# Patient Record
Sex: MALE | Race: WHITE
[De-identification: names, ages, dates, MRNs, and addresses within clinical notes are randomized per-mention and may not be internally consistent; named-entity substitution may affect disease eponyms.]

---

## 2020-09-17 ENCOUNTER — ERX REFILL RESPONSE (OUTPATIENT)
Age: 35
End: 2020-09-17

## 2020-09-17 RX ORDER — MESALAMINE 1.2 G/1
TAKE 4 TABLETS BY MOUTH DAILY WITH MEAL TABLET, DELAYED RELEASE ORAL ONCE A DAY
Qty: 120 TABLET | Refills: 0

## 2020-10-19 ENCOUNTER — ERX REFILL RESPONSE (OUTPATIENT)
Age: 35
End: 2020-10-19

## 2020-10-19 RX ORDER — MESALAMINE 1.2 G/1
TAKE 4 TABLETS BY MOUTH DAILY WITH MEAL TABLET, DELAYED RELEASE ORAL ONCE A DAY
Qty: 120 | Refills: 0

## 2020-11-20 ENCOUNTER — TELEPHONE ENCOUNTER (OUTPATIENT)
Dept: URBAN - METROPOLITAN AREA CLINIC 92 | Facility: CLINIC | Age: 35
End: 2020-11-20

## 2020-11-20 ENCOUNTER — ERX REFILL RESPONSE (OUTPATIENT)
Age: 35
End: 2020-11-20

## 2020-11-20 RX ORDER — MESALAMINE 1.2 G/1
TAKE 4 TABLETS BY MOUTH DAILY WITH MEAL TABLET, DELAYED RELEASE ORAL ONCE A DAY
Qty: 120 | Refills: 0

## 2020-11-30 ENCOUNTER — OFFICE VISIT (OUTPATIENT)
Dept: URBAN - METROPOLITAN AREA CLINIC 126 | Facility: CLINIC | Age: 35
End: 2020-11-30
Payer: COMMERCIAL

## 2020-11-30 ENCOUNTER — WEB ENCOUNTER (OUTPATIENT)
Dept: URBAN - METROPOLITAN AREA CLINIC 126 | Facility: CLINIC | Age: 35
End: 2020-11-30

## 2020-11-30 ENCOUNTER — LAB OUTSIDE AN ENCOUNTER (OUTPATIENT)
Dept: URBAN - METROPOLITAN AREA CLINIC 126 | Facility: CLINIC | Age: 35
End: 2020-11-30

## 2020-11-30 DIAGNOSIS — K51.00 ULCERATIVE PANCOLITIS WITHOUT COMPLICATION: ICD-10-CM

## 2020-11-30 DIAGNOSIS — R53.83 DECREASED ENERGY: ICD-10-CM

## 2020-11-30 PROCEDURE — G8427 DOCREV CUR MEDS BY ELIG CLIN: HCPCS | Performed by: INTERNAL MEDICINE

## 2020-11-30 PROCEDURE — G8420 CALC BMI NORM PARAMETERS: HCPCS | Performed by: INTERNAL MEDICINE

## 2020-11-30 PROCEDURE — G8482 FLU IMMUNIZE ORDER/ADMIN: HCPCS | Performed by: INTERNAL MEDICINE

## 2020-11-30 PROCEDURE — 99214 OFFICE O/P EST MOD 30 MIN: CPT | Performed by: INTERNAL MEDICINE

## 2020-11-30 RX ORDER — MESALAMINE 1.2 G/1
TAKE 4 TABLETS BY MOUTH DAILY WITH MEAL TABLET, DELAYED RELEASE ORAL ONCE A DAY
Qty: 120 | Refills: 0 | Status: ACTIVE | COMMUNITY

## 2020-11-30 RX ORDER — DICYCLOMINE HYDROCHLORIDE 10 MG/1
TAKE 1 CAPSULE (10 MG) BY ORAL ROUTE 3 TIMES PER DAY AS NEEDED FOR ABDOMINAL PAIN CAPSULE ORAL
Qty: 20 | Refills: 0 | Status: ACTIVE | COMMUNITY
Start: 2019-03-13

## 2020-11-30 NOTE — HPI-TODAY'S VISIT:
Very pleasant 35 yr old male with Ulcerative colitis diagnosed 2018, He is on mesalamine. He denies UC symptoms. Has 1-2 BM/day. no blood. no abdominal pain. he has noted decreased energy lately.  No EIM.

## 2020-12-01 LAB
A/G RATIO: 1.5
ALBUMIN: 4.1
ALKALINE PHOSPHATASE: 116
ALT (SGPT): 17
AST (SGOT): 16
BASO (ABSOLUTE): 0
BASOS: 1
BILIRUBIN, TOTAL: 0.3
BUN/CREATININE RATIO: 10
BUN: 17
C-REACTIVE PROTEIN, QUANT: 18
CALCIUM: 9.1
CARBON DIOXIDE, TOTAL: 21
CHLORIDE: 101
CREATININE: 1.67
EGFR IF AFRICN AM: 60
EGFR IF NONAFRICN AM: 52
EOS (ABSOLUTE): 0.1
EOS: 2
GLOBULIN, TOTAL: 2.7
GLUCOSE: 80
HEMATOCRIT: 38.7
HEMATOLOGY COMMENTS:: (no result)
HEMOGLOBIN: 12.9
IMMATURE CELLS: (no result)
IMMATURE GRANS (ABS): 0
IMMATURE GRANULOCYTES: 0
LYMPHS (ABSOLUTE): 1
LYMPHS: 16
MCH: 27.6
MCHC: 33.3
MCV: 83
MONOCYTES(ABSOLUTE): 0.6
MONOCYTES: 9
NEUTROPHILS (ABSOLUTE): 4.8
NEUTROPHILS: 72
NRBC: (no result)
PLATELETS: 346
POTASSIUM: 4.6
PROTEIN, TOTAL: 6.8
RBC: 4.68
RDW: 11.9
SEDIMENTATION RATE-WESTERGREN: 34
SODIUM: 139
TSH: 1.77
WBC: 6.6

## 2021-01-03 ENCOUNTER — ERX REFILL RESPONSE (OUTPATIENT)
Age: 36
End: 2021-01-03

## 2021-01-03 RX ORDER — MESALAMINE 1.2 G/1
TAKE 4 TABLETS BY MOUTH DAILY WITH MEAL TABLET, DELAYED RELEASE ORAL
Qty: 120 | Refills: 8

## 2021-02-04 ENCOUNTER — TELEPHONE ENCOUNTER (OUTPATIENT)
Dept: URBAN - METROPOLITAN AREA CLINIC 80 | Facility: CLINIC | Age: 36
End: 2021-02-04

## 2021-02-08 ENCOUNTER — OFFICE VISIT (OUTPATIENT)
Dept: URBAN - METROPOLITAN AREA SURGERY CENTER 19 | Facility: SURGERY CENTER | Age: 36
End: 2021-02-08

## 2021-03-10 ENCOUNTER — OFFICE VISIT (OUTPATIENT)
Dept: URBAN - METROPOLITAN AREA CLINIC 126 | Facility: CLINIC | Age: 36
End: 2021-03-10

## 2021-04-12 ENCOUNTER — OFFICE VISIT (OUTPATIENT)
Dept: URBAN - METROPOLITAN AREA SURGERY CENTER 19 | Facility: SURGERY CENTER | Age: 36
End: 2021-04-12
Payer: COMMERCIAL

## 2021-04-12 DIAGNOSIS — K51.00 CHRONIC PANCOLONIC ULCERATIVE COLITIS: ICD-10-CM

## 2021-04-12 PROCEDURE — G8907 PT DOC NO EVENTS ON DISCHARG: HCPCS | Performed by: INTERNAL MEDICINE

## 2021-04-12 PROCEDURE — 45380 COLONOSCOPY AND BIOPSY: CPT | Performed by: INTERNAL MEDICINE

## 2021-04-12 RX ORDER — MESALAMINE 1.2 G/1
TAKE 4 TABLETS BY MOUTH DAILY WITH MEAL TABLET, DELAYED RELEASE ORAL
Qty: 120 | Refills: 8 | Status: ACTIVE | COMMUNITY

## 2021-04-12 RX ORDER — DICYCLOMINE HYDROCHLORIDE 10 MG/1
TAKE 1 CAPSULE (10 MG) BY ORAL ROUTE 3 TIMES PER DAY AS NEEDED FOR ABDOMINAL PAIN CAPSULE ORAL
Qty: 20 | Refills: 0 | Status: ACTIVE | COMMUNITY
Start: 2019-03-13

## 2021-04-21 ENCOUNTER — OFFICE VISIT (OUTPATIENT)
Dept: URBAN - METROPOLITAN AREA TELEHEALTH 2 | Facility: TELEHEALTH | Age: 36
End: 2021-04-21
Payer: COMMERCIAL

## 2021-04-21 DIAGNOSIS — R19.7 DIARRHEA, UNSPECIFIED TYPE: ICD-10-CM

## 2021-04-21 DIAGNOSIS — K51.00 ULCERATIVE PANCOLITIS WITHOUT COMPLICATION: ICD-10-CM

## 2021-04-21 PROCEDURE — 99443 PHONE E/M BY PHYS 21-30 MIN: CPT | Performed by: INTERNAL MEDICINE

## 2021-04-21 RX ORDER — BUDESONIDE 3 MG/1
3 CAPSULES DAILY FOR 30 DAYS, THEN 2 DAILY FOR 2 WEEKS, THEN 1 DAILY FOR 1 WEEK CAPSULE, COATED PELLETS ORAL ONCE A DAY
Qty: 90 CAPSULE | Refills: 1 | OUTPATIENT

## 2021-04-21 RX ORDER — MESALAMINE 1.2 G/1
TAKE 4 TABLETS BY MOUTH DAILY WITH MEAL TABLET, DELAYED RELEASE ORAL
Qty: 120 | Refills: 8 | Status: ACTIVE | COMMUNITY

## 2021-04-21 RX ORDER — DICYCLOMINE HYDROCHLORIDE 10 MG/1
TAKE 1 CAPSULE (10 MG) BY ORAL ROUTE 3 TIMES PER DAY AS NEEDED FOR ABDOMINAL PAIN CAPSULE ORAL
Qty: 20 | Refills: 0 | Status: ACTIVE | COMMUNITY
Start: 2019-03-13

## 2021-04-21 NOTE — HPI-TODAY'S VISIT:
35 yr old male with ulcerative colitis-takes mesalamine Flare past 6 months bad past 3 months blood with BM 8-10, has lost 20 pounds not much cramping colonoscopy 4/12/21 with erythema and bx with active chronic colitis Supposed to get his 2nd dose of Moderna tomorrow

## 2021-04-27 ENCOUNTER — OFFICE VISIT (OUTPATIENT)
Dept: URBAN - METROPOLITAN AREA TELEHEALTH 2 | Facility: TELEHEALTH | Age: 36
End: 2021-04-27

## 2021-04-29 ENCOUNTER — LAB OUTSIDE AN ENCOUNTER (OUTPATIENT)
Dept: URBAN - METROPOLITAN AREA CLINIC 80 | Facility: CLINIC | Age: 36
End: 2021-04-29

## 2021-04-30 LAB
A/G RATIO: 1.4
ALBUMIN: 4
ALKALINE PHOSPHATASE: 101
ALT (SGPT): 10
AST (SGOT): 13
BASO (ABSOLUTE): 0
BASOS: 1
BILIRUBIN, TOTAL: 0.2
BUN/CREATININE RATIO: 9
BUN: 17
C-REACTIVE PROTEIN, QUANT: 33
CALCIUM: 9.1
CARBON DIOXIDE, TOTAL: 23
CHLORIDE: 103
CREATININE: 1.95
EGFR IF AFRICN AM: 49
EGFR IF NONAFRICN AM: 43
EOS (ABSOLUTE): 0.2
EOS: 3
GLOBULIN, TOTAL: 2.8
GLUCOSE: 80
HBSAG SCREEN: NEGATIVE
HEMATOCRIT: 36.7
HEMATOLOGY COMMENTS:: (no result)
HEMOGLOBIN: 11.4
HEP A AB, IGM: NEGATIVE
HEP B CORE AB, IGM: NEGATIVE
HEP C VIRUS AB: <0.1
IMMATURE CELLS: (no result)
IMMATURE GRANS (ABS): 0
IMMATURE GRANULOCYTES: 1
LYMPHS (ABSOLUTE): 0.9
LYMPHS: 16
MCH: 25.7
MCHC: 31.1
MCV: 83
MONOCYTES(ABSOLUTE): 0.6
MONOCYTES: 11
NEUTROPHILS (ABSOLUTE): 3.8
NEUTROPHILS: 68
NRBC: (no result)
PLATELETS: 413
POTASSIUM: 4.6
PROTEIN, TOTAL: 6.8
RBC: 4.43
RDW: 12.2
SEDIMENTATION RATE-WESTERGREN: 39
SODIUM: 140
WBC: 5.5

## 2021-05-06 ENCOUNTER — OFFICE VISIT (OUTPATIENT)
Dept: URBAN - METROPOLITAN AREA TELEHEALTH 2 | Facility: TELEHEALTH | Age: 36
End: 2021-05-06
Payer: COMMERCIAL

## 2021-05-06 DIAGNOSIS — K51.00 ULCERATIVE PANCOLITIS WITHOUT COMPLICATION: ICD-10-CM

## 2021-05-06 DIAGNOSIS — R79.89 ELEVATED SERUM CREATININE: ICD-10-CM

## 2021-05-06 PROCEDURE — 99213 OFFICE O/P EST LOW 20 MIN: CPT | Performed by: INTERNAL MEDICINE

## 2021-05-06 RX ORDER — BUDESONIDE 3 MG/1
3 CAPSULES DAILY FOR 30 DAYS, THEN 2 DAILY FOR 2 WEEKS, THEN 1 DAILY FOR 1 WEEK CAPSULE, COATED PELLETS ORAL ONCE A DAY
Qty: 90 CAPSULE | Refills: 1 | Status: ACTIVE | COMMUNITY

## 2021-05-06 RX ORDER — DICYCLOMINE HYDROCHLORIDE 10 MG/1
TAKE 1 CAPSULE (10 MG) BY ORAL ROUTE 3 TIMES PER DAY AS NEEDED FOR ABDOMINAL PAIN CAPSULE ORAL
Qty: 20 | Refills: 0 | Status: ACTIVE | COMMUNITY
Start: 2019-03-13

## 2021-05-06 RX ORDER — MESALAMINE 1.2 G/1
TAKE 4 TABLETS BY MOUTH DAILY WITH MEAL TABLET, DELAYED RELEASE ORAL
Qty: 120 | Refills: 8 | Status: ACTIVE | COMMUNITY

## 2021-05-06 RX ORDER — ADALIMUMAB 40MG/0.4ML
1 SUBQ  Q 2 WEEKS KIT SUBCUTANEOUS
Qty: 6 | Refills: 0 | OUTPATIENT
Start: 2021-05-06 | End: 2021-08-04

## 2021-05-06 RX ORDER — BUDESONIDE 3 MG/1
3 CAPSULES DAILY FOR 30 DAYS, THEN 2 DAILY FOR 2 WEEKS, THEN 1 DAILY FOR 1 WEEK CAPSULE, COATED PELLETS ORAL ONCE A DAY
Qty: 90 CAPSULE | Refills: 1 | OUTPATIENT

## 2021-05-06 NOTE — HPI-TODAY'S VISIT:
Has been taking Lialda 4T daily- compliant  Colonoscopy with chronic active colitis  States that during [place] exercising a lot and was doing great.   Flaring recently- budesonide given ~2 wks improving after about a week: solid BMs now.

## 2021-06-01 ENCOUNTER — TELEPHONE ENCOUNTER (OUTPATIENT)
Dept: URBAN - METROPOLITAN AREA CLINIC 80 | Facility: CLINIC | Age: 36
End: 2021-06-01

## 2021-06-03 ENCOUNTER — LAB OUTSIDE AN ENCOUNTER (OUTPATIENT)
Dept: URBAN - METROPOLITAN AREA CLINIC 80 | Facility: CLINIC | Age: 36
End: 2021-06-03

## 2021-06-05 LAB
MITOGEN-NIL: >10
NIL: 0.02
QUANTIFERON(R)-TB GOLD: NEGATIVE
TB1-NIL: 0
TB2-NIL: 0

## 2021-06-10 ENCOUNTER — TELEPHONE ENCOUNTER (OUTPATIENT)
Dept: URBAN - METROPOLITAN AREA CLINIC 80 | Facility: CLINIC | Age: 36
End: 2021-06-10

## 2021-06-10 RX ORDER — ADALIMUMAB 40MG/0.4ML
1 SUBQ  Q 2 WEEKS KIT SUBCUTANEOUS
Qty: 6 | Refills: 1 | OUTPATIENT
Start: 2021-06-16 | End: 2021-12-12

## 2021-06-10 RX ORDER — BUDESONIDE 3 MG/1
3 CAPSULES DAILY FOR 30 DAYS, THEN 2 DAILY FOR 2 WEEKS, THEN 1 DAILY FOR 1 WEEK CAPSULE, COATED PELLETS ORAL ONCE A DAY
Qty: 90 | Refills: 0

## 2021-06-17 ENCOUNTER — TELEPHONE ENCOUNTER (OUTPATIENT)
Dept: URBAN - METROPOLITAN AREA CLINIC 23 | Facility: CLINIC | Age: 36
End: 2021-06-17

## 2021-06-24 ENCOUNTER — TELEPHONE ENCOUNTER (OUTPATIENT)
Dept: URBAN - METROPOLITAN AREA CLINIC 80 | Facility: CLINIC | Age: 36
End: 2021-06-24

## 2021-06-24 RX ORDER — ADALIMUMAB 40MG/0.4ML
1 SUBQ  Q 2 WEEKS KIT SUBCUTANEOUS
Refills: 0
Start: 2021-05-06 | End: 2021-09-22

## 2021-06-25 ENCOUNTER — TELEPHONE ENCOUNTER (OUTPATIENT)
Dept: URBAN - METROPOLITAN AREA CLINIC 80 | Facility: CLINIC | Age: 36
End: 2021-06-25

## 2021-06-30 ENCOUNTER — TELEPHONE ENCOUNTER (OUTPATIENT)
Dept: URBAN - METROPOLITAN AREA CLINIC 80 | Facility: CLINIC | Age: 36
End: 2021-06-30

## 2021-06-30 RX ORDER — ADALIMUMAB 80MG/0.8ML
80MG DAY 1, DAY 2, DAY 15 KIT SUBCUTANEOUS
Qty: 3 PEN NEEDLE | Refills: 1 | OUTPATIENT
Start: 2021-06-30 | End: 2021-07-30

## 2021-07-29 ENCOUNTER — TELEPHONE ENCOUNTER (OUTPATIENT)
Dept: URBAN - METROPOLITAN AREA CLINIC 80 | Facility: CLINIC | Age: 36
End: 2021-07-29

## 2021-08-16 ENCOUNTER — OFFICE VISIT (OUTPATIENT)
Dept: URBAN - METROPOLITAN AREA CLINIC 80 | Facility: CLINIC | Age: 36
End: 2021-08-16
Payer: COMMERCIAL

## 2021-08-16 DIAGNOSIS — K51.00 ULCERATIVE PANCOLITIS WITHOUT COMPLICATION: ICD-10-CM

## 2021-08-16 PROCEDURE — 99213 OFFICE O/P EST LOW 20 MIN: CPT | Performed by: INTERNAL MEDICINE

## 2021-08-16 RX ORDER — MESALAMINE 1.2 G/1
TAKE 4 TABLETS BY MOUTH DAILY WITH MEAL TABLET, DELAYED RELEASE ORAL
Qty: 120 | Refills: 8 | Status: ACTIVE | COMMUNITY

## 2021-08-16 RX ORDER — DICYCLOMINE HYDROCHLORIDE 10 MG/1
TAKE 1 CAPSULE (10 MG) BY ORAL ROUTE 3 TIMES PER DAY AS NEEDED FOR ABDOMINAL PAIN CAPSULE ORAL
Qty: 20 | Refills: 0 | Status: ACTIVE | COMMUNITY
Start: 2019-03-13

## 2021-08-16 RX ORDER — ADALIMUMAB 40MG/0.4ML
1 SUBQ  Q 2 WEEKS KIT SUBCUTANEOUS
Refills: 0 | Status: ACTIVE | COMMUNITY
Start: 2021-05-06 | End: 2021-09-22

## 2021-08-16 RX ORDER — ADALIMUMAB 40MG/0.4ML
1 SUBQ  Q 2 WEEKS KIT SUBCUTANEOUS
Qty: 6 | Refills: 1 | Status: ACTIVE | COMMUNITY
Start: 2021-06-16 | End: 2021-12-12

## 2021-08-16 RX ORDER — ADALIMUMAB 40MG/0.4ML
1 SUBQ  Q 2 WEEKS KIT SUBCUTANEOUS
Refills: 0

## 2021-08-16 RX ORDER — BUDESONIDE 3 MG/1
3 CAPSULES DAILY FOR 30 DAYS, THEN 2 DAILY FOR 2 WEEKS, THEN 1 DAILY FOR 1 WEEK CAPSULE, COATED PELLETS ORAL ONCE A DAY
Qty: 90 | Refills: 0 | Status: ACTIVE | COMMUNITY

## 2021-08-16 NOTE — HPI-TODAY'S VISIT:
Humira-  issues with getting started.  (didn't get the starter kit initial)  Started Humira: a couple months ago: has done induction Stayed on the budesonide a long time as he was getting started, tapered and now off.   Bowels are pretty good- since on it maybe a few loose stools other than that has been solid.  Much much better. Humira pen misfired last thursday misfired.

## 2021-08-17 ENCOUNTER — OFFICE VISIT (OUTPATIENT)
Dept: URBAN - METROPOLITAN AREA TELEHEALTH 2 | Facility: TELEHEALTH | Age: 36
End: 2021-08-17

## 2021-08-17 RX ORDER — BUDESONIDE 3 MG/1
3 CAPSULES DAILY FOR 30 DAYS, THEN 2 DAILY FOR 2 WEEKS, THEN 1 DAILY FOR 1 WEEK CAPSULE, COATED PELLETS ORAL ONCE A DAY
Qty: 90 | Refills: 0 | Status: ON HOLD | COMMUNITY

## 2021-08-17 RX ORDER — MESALAMINE 1.2 G/1
TAKE 4 TABLETS BY MOUTH DAILY WITH MEAL TABLET, DELAYED RELEASE ORAL
Qty: 120 | Refills: 8 | Status: ON HOLD | COMMUNITY

## 2021-08-17 RX ORDER — ADALIMUMAB 40MG/0.4ML
1 SUBQ  Q 2 WEEKS KIT SUBCUTANEOUS
Qty: 6 | Refills: 1 | Status: ACTIVE | COMMUNITY
Start: 2021-06-16 | End: 2021-12-12

## 2021-08-17 RX ORDER — DICYCLOMINE HYDROCHLORIDE 10 MG/1
TAKE 1 CAPSULE (10 MG) BY ORAL ROUTE 3 TIMES PER DAY AS NEEDED FOR ABDOMINAL PAIN CAPSULE ORAL
Qty: 20 | Refills: 0 | Status: ACTIVE | COMMUNITY
Start: 2019-03-13

## 2021-08-17 RX ORDER — ADALIMUMAB 40MG/0.4ML
1 SUBQ  Q 2 WEEKS KIT SUBCUTANEOUS
Refills: 0 | Status: ON HOLD | COMMUNITY
Start: 2021-05-06 | End: 2021-09-22

## 2021-09-13 ENCOUNTER — TELEPHONE ENCOUNTER (OUTPATIENT)
Dept: URBAN - METROPOLITAN AREA CLINIC 80 | Facility: CLINIC | Age: 36
End: 2021-09-13

## 2021-09-13 RX ORDER — ADALIMUMAB 40MG/0.4ML
1 SUBQ  Q 2 WEEKS KIT SUBCUTANEOUS
Qty: 2 | Refills: 0
Start: 2021-06-16 | End: 2021-09-27

## 2021-11-16 ENCOUNTER — OFFICE VISIT (OUTPATIENT)
Dept: URBAN - METROPOLITAN AREA TELEHEALTH 2 | Facility: TELEHEALTH | Age: 36
End: 2021-11-16
Payer: COMMERCIAL

## 2021-11-16 DIAGNOSIS — K51.00 ULCERATIVE PANCOLITIS WITHOUT COMPLICATION: ICD-10-CM

## 2021-11-16 DIAGNOSIS — R79.89 ELEVATED SERUM CREATININE: ICD-10-CM

## 2021-11-16 PROCEDURE — 99213 OFFICE O/P EST LOW 20 MIN: CPT | Performed by: INTERNAL MEDICINE

## 2021-11-16 RX ORDER — MESALAMINE 1.2 G/1
TAKE 4 TABLETS BY MOUTH DAILY WITH MEAL TABLET, DELAYED RELEASE ORAL
Qty: 120 | Refills: 8 | COMMUNITY

## 2021-11-16 RX ORDER — ADALIMUMAB 40MG/0.4ML
1 SUBQ KIT SUBCUTANEOUS
Qty: 7 | Refills: 3 | OUTPATIENT
Start: 2021-11-16 | End: 2022-11-11

## 2021-11-16 RX ORDER — DICYCLOMINE HYDROCHLORIDE 10 MG/1
TAKE 1 CAPSULE (10 MG) BY ORAL ROUTE 3 TIMES PER DAY AS NEEDED FOR ABDOMINAL PAIN CAPSULE ORAL
Qty: 20 | Refills: 0 | Status: ACTIVE | COMMUNITY
Start: 2019-03-13

## 2021-11-16 RX ORDER — ADALIMUMAB 40MG/0.8ML
0.8 ML KIT SUBCUTANEOUS
Status: ACTIVE | COMMUNITY

## 2021-11-16 RX ORDER — BUDESONIDE 3 MG/1
3 CAPSULES DAILY FOR 30 DAYS, THEN 2 DAILY FOR 2 WEEKS, THEN 1 DAILY FOR 1 WEEK CAPSULE, COATED PELLETS ORAL ONCE A DAY
Qty: 90 | Refills: 0 | COMMUNITY

## 2022-01-04 ENCOUNTER — LAB OUTSIDE AN ENCOUNTER (OUTPATIENT)
Dept: URBAN - METROPOLITAN AREA CLINIC 80 | Facility: CLINIC | Age: 37
End: 2022-01-04

## 2022-01-04 ENCOUNTER — OFFICE VISIT (OUTPATIENT)
Dept: URBAN - METROPOLITAN AREA CLINIC 80 | Facility: CLINIC | Age: 37
End: 2022-01-04
Payer: COMMERCIAL

## 2022-01-04 DIAGNOSIS — K51.00 ULCERATIVE PANCOLITIS WITHOUT COMPLICATION: ICD-10-CM

## 2022-01-04 DIAGNOSIS — R79.89 ELEVATED SERUM CREATININE: ICD-10-CM

## 2022-01-04 PROCEDURE — 99214 OFFICE O/P EST MOD 30 MIN: CPT | Performed by: NURSE PRACTITIONER

## 2022-01-04 RX ORDER — DICYCLOMINE HYDROCHLORIDE 10 MG/1
TAKE 1 CAPSULE (10 MG) BY ORAL ROUTE 3 TIMES PER DAY AS NEEDED FOR ABDOMINAL PAIN CAPSULE ORAL
Qty: 20 | Refills: 0 | Status: ACTIVE | COMMUNITY
Start: 2019-03-13

## 2022-01-04 RX ORDER — MESALAMINE 1.2 G/1
TAKE 4 TABLETS BY MOUTH DAILY WITH MEAL TABLET, DELAYED RELEASE ORAL
Qty: 120 | Refills: 8 | Status: DISCONTINUED | COMMUNITY

## 2022-01-04 RX ORDER — ADALIMUMAB 40MG/0.8ML
0.8 ML KIT SUBCUTANEOUS
Status: ACTIVE | COMMUNITY

## 2022-01-04 RX ORDER — ADALIMUMAB 40MG/0.4ML
1 SUBQ KIT SUBCUTANEOUS
Qty: 7 | Refills: 3 | Status: ACTIVE | COMMUNITY
Start: 2021-11-16 | End: 2022-11-11

## 2022-01-04 RX ORDER — BUDESONIDE 3 MG/1
3 CAPSULES DAILY FOR 30 DAYS, THEN 2 DAILY FOR 2 WEEKS, THEN 1 DAILY FOR 1 WEEK CAPSULE, COATED PELLETS ORAL ONCE A DAY
Qty: 90 | Refills: 0 | Status: ON HOLD | COMMUNITY

## 2022-01-04 NOTE — HPI-TODAY'S VISIT:
Very pleasant 36-year-old male with ulcerative colitis here today in follow-up after a possible flare.  Patient takes Humira every 2 weeks.  He started medication in July and this seems to be working very well for his symptoms.  He did have Covid several weeks ago and at this time had a flare with diarrhea.  He was unable to get an appointment for 2 weeks so he took budesonide that he had at home.  The diarrhea has since resolved.  He took his scheduled dose of Humira yesterday and today feels great. immunized for COVID 5/2021, has not rec'd booster. will wait now since recent COVID infection
5

## 2022-01-05 LAB
A/G RATIO: 2
ALBUMIN: 4.7
ALKALINE PHOSPHATASE: 84
ALT (SGPT): 19
AST (SGOT): 20
BILIRUBIN, TOTAL: 0.4
BUN/CREATININE RATIO: 11
BUN: 20
C-REACTIVE PROTEIN, QUANT: <1
CALCIUM: 9.3
CARBON DIOXIDE, TOTAL: 23
CHLORIDE: 103
CREATININE: 1.75
EGFR IF AFRICN AM: 56
EGFR IF NONAFRICN AM: 49
GLOBULIN, TOTAL: 2.3
GLUCOSE: 86
HEMATOCRIT: 42.3
HEMOGLOBIN: 14.7
MCH: 29.7
MCHC: 34.8
MCV: 86
NRBC: (no result)
PLATELETS: 300
POTASSIUM: 4.8
PROTEIN, TOTAL: 7
RBC: 4.95
RDW: 12.4
SEDIMENTATION RATE-WESTERGREN: 3
SODIUM: 139
WBC: 4.5

## 2022-03-18 NOTE — PHYSICAL EXAM EYES:
Conjuntivae and eyelids appear normal, Sclerae : White without injection
This is a surgical and/or non-medical patient.

## 2022-04-05 ENCOUNTER — OFFICE VISIT (OUTPATIENT)
Dept: URBAN - METROPOLITAN AREA CLINIC 80 | Facility: CLINIC | Age: 37
End: 2022-04-05
Payer: COMMERCIAL

## 2022-04-05 DIAGNOSIS — R79.89 ELEVATED SERUM CREATININE: ICD-10-CM

## 2022-04-05 DIAGNOSIS — K51.00 ULCERATIVE PANCOLITIS WITHOUT COMPLICATION: ICD-10-CM

## 2022-04-05 PROCEDURE — 99204 OFFICE O/P NEW MOD 45 MIN: CPT | Performed by: NURSE PRACTITIONER

## 2022-04-05 RX ORDER — ADALIMUMAB 40MG/0.8ML
0.8 ML KIT SUBCUTANEOUS
Status: ACTIVE | COMMUNITY

## 2022-04-05 RX ORDER — LORATADINE 10 MG/1
1 TABLET TABLET ORAL ONCE A DAY
Status: ACTIVE | COMMUNITY

## 2022-04-05 RX ORDER — DICYCLOMINE HYDROCHLORIDE 10 MG/1
TAKE 1 CAPSULE (10 MG) BY ORAL ROUTE 3 TIMES PER DAY AS NEEDED FOR ABDOMINAL PAIN CAPSULE ORAL
Qty: 20 | Refills: 0 | Status: ON HOLD | COMMUNITY
Start: 2019-03-13

## 2022-04-05 RX ORDER — BUDESONIDE 3 MG/1
3 CAPSULES DAILY FOR 30 DAYS, THEN 2 DAILY FOR 2 WEEKS, THEN 1 DAILY FOR 1 WEEK CAPSULE, COATED PELLETS ORAL ONCE A DAY
Qty: 90 | Refills: 0 | Status: ON HOLD | COMMUNITY

## 2022-04-05 RX ORDER — ADALIMUMAB 40MG/0.4ML
1 SUBQ KIT SUBCUTANEOUS
Qty: 7 | Refills: 3 | Status: ACTIVE | COMMUNITY
Start: 2021-11-16 | End: 2022-11-11

## 2022-04-05 NOTE — HPI-TODAY'S VISIT:
Very pleasant 36-year-old male with ulcerative colitis seen today for interval follow-up.  Since his last visit he denies any flares.  He denies any overt GI bleeding or abdominal cramping he is taking Humira every 2 weeks and is without complaint. Denies EIM.  He does follow with renal due to decreased kidney function.

## 2022-04-06 LAB
A/G RATIO: 2
ALBUMIN: 4.8
ALKALINE PHOSPHATASE: 77
ALT (SGPT): 31
AST (SGOT): 30
BASO (ABSOLUTE): 0
BASOS: 1
BILIRUBIN, TOTAL: 0.4
BUN/CREATININE RATIO: 11
BUN: 19
C-REACTIVE PROTEIN, QUANT: <1
CALCIUM: 9.6
CARBON DIOXIDE, TOTAL: 21
CHLORIDE: 101
CREATININE: 1.79
EGFR: 49
EOS (ABSOLUTE): 0.3
EOS: 6
GLOBULIN, TOTAL: 2.4
GLUCOSE: 83
HEMATOCRIT: 46.8
HEMATOLOGY COMMENTS:: (no result)
HEMOGLOBIN: 15.4
IMMATURE CELLS: (no result)
IMMATURE GRANS (ABS): 0
IMMATURE GRANULOCYTES: 0
LYMPHS (ABSOLUTE): 1.7
LYMPHS: 31
MCH: 29
MCHC: 32.9
MCV: 88
MONOCYTES(ABSOLUTE): 0.4
MONOCYTES: 8
NEUTROPHILS (ABSOLUTE): 3
NEUTROPHILS: 54
NRBC: (no result)
PLATELETS: 266
POTASSIUM: 4.5
PROTEIN, TOTAL: 7.2
RBC: 5.31
RDW: 12.6
SEDIMENTATION RATE-WESTERGREN: 7
SODIUM: 137
WBC: 5.4

## 2022-06-21 ENCOUNTER — OFFICE VISIT (OUTPATIENT)
Dept: URBAN - METROPOLITAN AREA CLINIC 80 | Facility: CLINIC | Age: 37
End: 2022-06-21
Payer: COMMERCIAL

## 2022-06-21 VITALS
WEIGHT: 229 LBS | BODY MASS INDEX: 28.47 KG/M2 | TEMPERATURE: 97.1 F | SYSTOLIC BLOOD PRESSURE: 90 MMHG | HEIGHT: 75 IN | HEART RATE: 50 BPM | DIASTOLIC BLOOD PRESSURE: 60 MMHG

## 2022-06-21 DIAGNOSIS — K51.00 ULCERATIVE PANCOLITIS WITHOUT COMPLICATION: ICD-10-CM

## 2022-06-21 PROCEDURE — 99214 OFFICE O/P EST MOD 30 MIN: CPT | Performed by: NURSE PRACTITIONER

## 2022-06-21 RX ORDER — ADALIMUMAB 40MG/0.8ML
0.8 ML KIT SUBCUTANEOUS
Status: ACTIVE | COMMUNITY

## 2022-06-21 RX ORDER — LORATADINE 10 MG/1
1 TABLET TABLET ORAL ONCE A DAY
Status: ACTIVE | COMMUNITY

## 2022-06-21 RX ORDER — ADALIMUMAB 40MG/0.4ML
1 SUBQ KIT SUBCUTANEOUS
Qty: 7 | Refills: 3 | Status: ACTIVE | COMMUNITY
Start: 2021-11-16 | End: 2022-11-11

## 2022-06-21 RX ORDER — PREDNISONE 10 MG/1
1 TABLET TABLET ORAL ONCE A DAY
Qty: 30 TABLET | Refills: 0 | OUTPATIENT
Start: 2022-06-21 | End: 2022-07-21

## 2022-06-21 RX ORDER — BUDESONIDE 3 MG/1
3 CAPSULES DAILY FOR 30 DAYS, THEN 2 DAILY FOR 2 WEEKS, THEN 1 DAILY FOR 1 WEEK CAPSULE, COATED PELLETS ORAL ONCE A DAY
Qty: 90 | Refills: 0 | Status: ON HOLD | COMMUNITY

## 2022-06-21 RX ORDER — DICYCLOMINE HYDROCHLORIDE 10 MG/1
TAKE 1 CAPSULE (10 MG) BY ORAL ROUTE 3 TIMES PER DAY AS NEEDED FOR ABDOMINAL PAIN CAPSULE ORAL
Qty: 20 | Refills: 0 | Status: ON HOLD | COMMUNITY
Start: 2019-03-13

## 2022-06-21 NOTE — HPI-TODAY'S VISIT:
Very pleasant 37-year-old male seen today in interval follow-up for ulcerative colitis.  He is taking Humira every 2 weeks.  He denies any flares or extraintestinal manifestations.  No overt GI bleeding and no abdominal pain.  He did have a mild flare around [place] after he had COVID but no other flares.

## 2022-06-21 NOTE — PHYSICAL EXAM NECK/THYROID:
Informed patient's mom.  Patient hasn't asked about patient's constipation status. Mom asked patient: patient states her last bowel movement was today (normal/regular).   She states patient has had a history of kidney issues.  Patient did not go to school today.    Mom agreeable to ultrasound. She can go to [facility] or prefers [place] for scheduling. There is no availability in [place] until Thurs. 12/16    Do you have any further recommendations before I call mom back to please call Central Scheduling at [phone] to schedule ultrasound?     normal appearance , without tenderness upon palpation , no deformities , trachea midline , Thyroid normal size , no thyroid nodules , no masses , no JVD , thyroid nontender

## 2022-06-22 LAB
A/G RATIO: 2.5
ALBUMIN: 4.7
ALKALINE PHOSPHATASE: 63
ALT (SGPT): 19
AST (SGOT): 16
BASO (ABSOLUTE): 0
BASOS: 1
BILIRUBIN, TOTAL: 0.4
BUN/CREATININE RATIO: 13
BUN: 21
C-REACTIVE PROTEIN, QUANT: <1
CALCIUM: 9.3
CARBON DIOXIDE, TOTAL: 22
CHLORIDE: 104
CREATININE: 1.6
EGFR: 56
EOS (ABSOLUTE): 0.2
EOS: 5
GLOBULIN, TOTAL: 1.9
GLUCOSE: 84
HEMATOCRIT: 44.1
HEMATOLOGY COMMENTS:: (no result)
HEMOGLOBIN: 13.9
IMMATURE CELLS: (no result)
IMMATURE GRANS (ABS): 0
IMMATURE GRANULOCYTES: 0
LYMPHS (ABSOLUTE): 1.7
LYMPHS: 35
MCH: 28.5
MCHC: 31.5
MCV: 91
MONOCYTES(ABSOLUTE): 0.3
MONOCYTES: 7
NEUTROPHILS (ABSOLUTE): 2.5
NEUTROPHILS: 52
NRBC: (no result)
PLATELETS: 249
POTASSIUM: 4.5
PROTEIN, TOTAL: 6.6
RBC: 4.87
RDW: 12.3
SEDIMENTATION RATE-WESTERGREN: 3
SODIUM: 138
WBC: 4.9

## 2022-11-08 ENCOUNTER — TELEPHONE ENCOUNTER (OUTPATIENT)
Dept: URBAN - METROPOLITAN AREA CLINIC 92 | Facility: CLINIC | Age: 37
End: 2022-11-08

## 2022-11-08 RX ORDER — ADALIMUMAB 40MG/0.4ML
1 SUBQ KIT SUBCUTANEOUS
Qty: 7 | Refills: 3
Start: 2021-11-16 | End: 2023-11-03

## 2022-12-20 ENCOUNTER — OFFICE VISIT (OUTPATIENT)
Dept: URBAN - METROPOLITAN AREA CLINIC 80 | Facility: CLINIC | Age: 37
End: 2022-12-20
Payer: COMMERCIAL

## 2022-12-20 VITALS
DIASTOLIC BLOOD PRESSURE: 80 MMHG | BODY MASS INDEX: 30.46 KG/M2 | HEIGHT: 75 IN | HEART RATE: 62 BPM | SYSTOLIC BLOOD PRESSURE: 122 MMHG | WEIGHT: 245 LBS | TEMPERATURE: 96.9 F

## 2022-12-20 DIAGNOSIS — K51.00 ULCERATIVE PANCOLITIS WITHOUT COMPLICATION: ICD-10-CM

## 2022-12-20 PROBLEM — 444548001: Status: ACTIVE | Noted: 2020-11-30

## 2022-12-20 PROCEDURE — 99213 OFFICE O/P EST LOW 20 MIN: CPT | Performed by: NURSE PRACTITIONER

## 2022-12-20 RX ORDER — ADALIMUMAB 40MG/0.4ML
1 SUBQ KIT SUBCUTANEOUS
Qty: 7 | Refills: 3 | Status: ACTIVE | COMMUNITY
Start: 2021-11-16 | End: 2023-11-03

## 2022-12-20 RX ORDER — BUDESONIDE 3 MG/1
3 CAPSULES DAILY FOR 30 DAYS, THEN 2 DAILY FOR 2 WEEKS, THEN 1 DAILY FOR 1 WEEK CAPSULE, COATED PELLETS ORAL ONCE A DAY
Qty: 90 | Refills: 0 | Status: ON HOLD | COMMUNITY

## 2022-12-20 RX ORDER — LORATADINE 10 MG/1
1 TABLET TABLET ORAL ONCE A DAY
Status: ACTIVE | COMMUNITY

## 2022-12-20 RX ORDER — ADALIMUMAB 40MG/0.8ML
0.8 ML KIT SUBCUTANEOUS
Status: ACTIVE | COMMUNITY

## 2022-12-20 RX ORDER — DICYCLOMINE HYDROCHLORIDE 10 MG/1
TAKE 1 CAPSULE (10 MG) BY ORAL ROUTE 3 TIMES PER DAY AS NEEDED FOR ABDOMINAL PAIN CAPSULE ORAL
Qty: 20 | Refills: 0 | Status: ON HOLD | COMMUNITY
Start: 2019-03-13

## 2022-12-20 NOTE — HPI-TODAY'S VISIT:
Very pleasant 37-year-old male seen today in interval follow-up for ulcerative colitis.  Patient is taking Humira every 2 weeks and doing very well.  He denies any flares or extraintestinal manifestations.  There is no overt GI bleeding and no abdominal pain. joint pains improved. Did see dermatology for routineskin check-no issues.

## 2022-12-21 LAB
A/G RATIO: 1.7
ALBUMIN: 4.5
ALKALINE PHOSPHATASE: 79
ALT (SGPT): 24
AST (SGOT): 19
BILIRUBIN, TOTAL: 0.4
BUN/CREATININE RATIO: 10
BUN: 17
C-REACTIVE PROTEIN, QUANT: 1
CALCIUM: 9.9
CARBON DIOXIDE, TOTAL: 23
CHLORIDE: 104
CREATININE: 1.7
EGFR: 52
GLOBULIN, TOTAL: 2.7
GLUCOSE: 84
HEMATOCRIT: 43.4
HEMOGLOBIN: 14.8
MCH: 28.6
MCHC: 34.1
MCV: 83.8
MPV: 10.6
PLATELET COUNT: 285
POTASSIUM: 4.6
PROTEIN, TOTAL: 7.2
RDW: 12.5
RED BLOOD CELL COUNT: 5.18
SED RATE BY MODIFIED: 2
SODIUM: 137
WHITE BLOOD CELL COUNT: 6.8

## 2023-06-20 ENCOUNTER — OFFICE VISIT (OUTPATIENT)
Dept: URBAN - METROPOLITAN AREA CLINIC 2 | Facility: CLINIC | Age: 38
End: 2023-06-20
Payer: COMMERCIAL

## 2023-06-20 ENCOUNTER — DASHBOARD ENCOUNTERS (OUTPATIENT)
Age: 38
End: 2023-06-20

## 2023-06-20 VITALS
DIASTOLIC BLOOD PRESSURE: 73 MMHG | WEIGHT: 244.4 LBS | HEART RATE: 74 BPM | HEIGHT: 75 IN | BODY MASS INDEX: 30.39 KG/M2 | SYSTOLIC BLOOD PRESSURE: 120 MMHG

## 2023-06-20 DIAGNOSIS — K51.00 ULCERATIVE PANCOLITIS WITHOUT COMPLICATION: ICD-10-CM

## 2023-06-20 PROCEDURE — 99213 OFFICE O/P EST LOW 20 MIN: CPT | Performed by: NURSE PRACTITIONER

## 2023-06-20 RX ORDER — ADALIMUMAB 40MG/0.4ML
1 SUBQ KIT SUBCUTANEOUS
Qty: 7 | Refills: 3 | Status: ACTIVE | COMMUNITY
Start: 2021-11-16 | End: 2023-11-03

## 2023-06-20 RX ORDER — LORATADINE 10 MG/1
1 TABLET TABLET ORAL ONCE A DAY
Status: ACTIVE | COMMUNITY

## 2023-06-20 RX ORDER — DICYCLOMINE HYDROCHLORIDE 10 MG/1
TAKE 1 CAPSULE (10 MG) BY ORAL ROUTE 3 TIMES PER DAY AS NEEDED FOR ABDOMINAL PAIN CAPSULE ORAL
Qty: 20 | Refills: 0 | Status: ON HOLD | COMMUNITY
Start: 2019-03-13

## 2023-06-20 RX ORDER — BUDESONIDE 3 MG/1
3 CAPSULES DAILY FOR 30 DAYS, THEN 2 DAILY FOR 2 WEEKS, THEN 1 DAILY FOR 1 WEEK CAPSULE, COATED PELLETS ORAL ONCE A DAY
Qty: 90 | Refills: 0 | Status: ON HOLD | COMMUNITY

## 2023-06-20 RX ORDER — ADALIMUMAB 40MG/0.8ML
0.8 ML KIT SUBCUTANEOUS
Status: ACTIVE | COMMUNITY

## 2023-06-20 NOTE — HPI-TODAY'S VISIT:
Very pleasant 38-year-old male seen today in interval follow-up for ulcerative colitis.  Patient is compliant with Humira.  His symptoms have been very well controlled.  He denies any flares or extraintestinal manifestation. had sinus infection recently-planned to see allergist for possible allergy shots. He continues to follow routinely with renal and reports kidney function continues to improve.

## 2023-11-27 ENCOUNTER — WEB ENCOUNTER (OUTPATIENT)
Dept: URBAN - METROPOLITAN AREA CLINIC 80 | Facility: CLINIC | Age: 38
End: 2023-11-27

## 2023-11-27 RX ORDER — ADALIMUMAB 40MG/0.8ML
0.8MILLILITER KIT SUBCUTANEOUS
Qty: 6 | Refills: 1

## 2023-12-05 ENCOUNTER — TELEPHONE ENCOUNTER (OUTPATIENT)
Dept: URBAN - METROPOLITAN AREA CLINIC 80 | Facility: CLINIC | Age: 38
End: 2023-12-05

## 2023-12-05 RX ORDER — ADALIMUMAB 40MG/0.8ML
0.8MILLILITER KIT SUBCUTANEOUS
Qty: 6 | Refills: 0
End: 2024-03-04

## 2023-12-06 ENCOUNTER — TELEPHONE ENCOUNTER (OUTPATIENT)
Dept: URBAN - METROPOLITAN AREA CLINIC 80 | Facility: CLINIC | Age: 38
End: 2023-12-06

## 2023-12-06 RX ORDER — ADALIMUMAB 40MG/0.4ML
0.4ML KIT SUBCUTANEOUS
Qty: 6 UNSPECIFIED | Refills: 1 | OUTPATIENT
Start: 2023-12-06 | End: 2024-06-03

## 2023-12-18 ENCOUNTER — WEB ENCOUNTER (OUTPATIENT)
Dept: URBAN - METROPOLITAN AREA CLINIC 80 | Facility: CLINIC | Age: 38
End: 2023-12-18

## 2023-12-18 RX ORDER — PREDNISONE 10 MG/1
4 TABLETS DAILY FOR 5 DAYS, 3 TABLETS DAILY FOR 5 DAYS. 2 TABLETS DAILY FOR 5 DAYS. 1 TABLET DAILY FOR 5 DAYS TABLET ORAL ONCE A DAY
Qty: 50 TABLET | Refills: 0 | OUTPATIENT
Start: 2023-12-18 | End: 2024-01-07

## 2023-12-18 NOTE — HPI-TODAY'S VISIT:
pt was in [place] last week for work conference. about 5 days ago began having abdominal discomfort. Thought food at conference didn't agree with him. Today awoke with severe diarrhea. Denies overt GI bleeding. Last dose Humira last Monday. no recent abx.

## 2023-12-19 ENCOUNTER — TELEPHONE ENCOUNTER (OUTPATIENT)
Dept: URBAN - METROPOLITAN AREA CLINIC 80 | Facility: CLINIC | Age: 38
End: 2023-12-19

## 2023-12-19 LAB
A/G RATIO: 1.4
ALBUMIN: 4.4
ALKALINE PHOSPHATASE: 70
ALT (SGPT): 23
AST (SGOT): 19
BILIRUBIN, TOTAL: 0.8
BUN/CREATININE RATIO: 13
BUN: 20
C-REACTIVE PROTEIN, QUANT: 11.7
CALCIUM: 9.5
CARBON DIOXIDE, TOTAL: 20
CHLORIDE: 104
CREATININE: 1.57
EGFR: 57
GLOBULIN, TOTAL: 3.2
GLUCOSE: 126
HEMATOCRIT: 47.9
HEMOGLOBIN: 16.2
MCH: 28.8
MCHC: 33.8
MCV: 85.2
MPV: 10.3
PLATELET COUNT: 275
POTASSIUM: 4.6
PROTEIN, TOTAL: 7.6
RDW: 13
RED BLOOD CELL COUNT: 5.62
SED RATE BY MODIFIED: 2
SODIUM: 134
WHITE BLOOD CELL COUNT: 10.1

## 2023-12-28 ENCOUNTER — WEB ENCOUNTER (OUTPATIENT)
Dept: URBAN - METROPOLITAN AREA CLINIC 80 | Facility: CLINIC | Age: 38
End: 2023-12-28

## 2025-03-03 ENCOUNTER — TELEPHONE ENCOUNTER (OUTPATIENT)
Dept: URBAN - METROPOLITAN AREA CLINIC 79 | Facility: CLINIC | Age: 40
End: 2025-03-03

## 2025-03-03 RX ORDER — ADALIMUMAB 40MG/0.4ML
INJECT 40 MG (0.4 ML) UNDER THE SKIN KIT SUBCUTANEOUS EVERY OTHER WEEK
Qty: 6 KIT | Refills: 3
End: 2026-02-26

## 2025-03-04 ENCOUNTER — OFFICE VISIT (OUTPATIENT)
Dept: URBAN - METROPOLITAN AREA CLINIC 2 | Facility: CLINIC | Age: 40
End: 2025-03-04
Payer: COMMERCIAL

## 2025-03-04 VITALS
TEMPERATURE: 98.2 F | DIASTOLIC BLOOD PRESSURE: 84 MMHG | BODY MASS INDEX: 29.47 KG/M2 | HEIGHT: 75 IN | WEIGHT: 237 LBS | HEART RATE: 87 BPM | SYSTOLIC BLOOD PRESSURE: 126 MMHG

## 2025-03-04 DIAGNOSIS — K51.00 ULCERATIVE PANCOLITIS WITHOUT COMPLICATION: ICD-10-CM

## 2025-03-04 PROCEDURE — 99213 OFFICE O/P EST LOW 20 MIN: CPT | Performed by: NURSE PRACTITIONER

## 2025-03-04 RX ORDER — DICYCLOMINE HYDROCHLORIDE 10 MG/1
TAKE 1 CAPSULE (10 MG) BY ORAL ROUTE 3 TIMES PER DAY AS NEEDED FOR ABDOMINAL PAIN CAPSULE ORAL
Qty: 20 | Refills: 0 | Status: ON HOLD | COMMUNITY
Start: 2019-03-13

## 2025-03-04 RX ORDER — ADALIMUMAB 40MG/0.4ML
INJECT 40 MG (0.4 ML) UNDER THE SKIN KIT SUBCUTANEOUS EVERY OTHER WEEK
Qty: 6 KIT | Refills: 3 | Status: ACTIVE | COMMUNITY
End: 2026-02-26

## 2025-03-04 RX ORDER — BUDESONIDE 3 MG/1
3 CAPSULES DAILY FOR 30 DAYS, THEN 2 DAILY FOR 2 WEEKS, THEN 1 DAILY FOR 1 WEEK CAPSULE, COATED PELLETS ORAL ONCE A DAY
Qty: 90 | Refills: 0 | Status: ON HOLD | COMMUNITY

## 2025-03-04 NOTE — HPI-TODAY'S VISIT:
Very pleasant 39-year-old male with ulcerative colitis seen today in interval follow-up after not being seen since 2023.  He has been doing well overall on Humira every 2 weeks.  Rare mild flares usually occur after a viral infection.  He denies hematochezia, abdominal pain, or urgency.  He has usually 1 bowel movement daily.  He denies extraintestinal manifestations.

## 2025-06-19 NOTE — PHYSICAL EXAM EYES:
Conjuntivae and eyelids appear normal , Sclerae : White without injection
MERCEDEZ and COPD overlap syndrome

## 2025-06-24 ENCOUNTER — CLAIMS CREATED FROM THE CLAIM WINDOW (OUTPATIENT)
Dept: URBAN - METROPOLITAN AREA CLINIC 4 | Facility: CLINIC | Age: 40
End: 2025-06-24
Payer: COMMERCIAL

## 2025-06-24 ENCOUNTER — CLAIMS CREATED FROM THE CLAIM WINDOW (OUTPATIENT)
Dept: URBAN - METROPOLITAN AREA SURGERY CENTER 19 | Facility: SURGERY CENTER | Age: 40
End: 2025-06-24
Payer: COMMERCIAL

## 2025-06-24 ENCOUNTER — OFFICE VISIT (OUTPATIENT)
Dept: URBAN - METROPOLITAN AREA SURGERY CENTER 19 | Facility: SURGERY CENTER | Age: 40
End: 2025-06-24

## 2025-06-24 DIAGNOSIS — K63.89 OTHER SPECIFIED DISEASES OF INTESTINE: ICD-10-CM

## 2025-06-24 DIAGNOSIS — K51.90 ULCERATIVE COLITIS: ICD-10-CM

## 2025-06-24 DIAGNOSIS — F10.99 ALCOHOL USE, UNSPECIFIED WITH UNSPECIFIED ALCOHOL-INDUCED DISORDER: ICD-10-CM

## 2025-06-24 PROCEDURE — 00811 ANES LWR INTST NDSC NOS: CPT | Performed by: NURSE ANESTHETIST, CERTIFIED REGISTERED

## 2025-06-24 PROCEDURE — 88305 TISSUE EXAM BY PATHOLOGIST: CPT | Performed by: PATHOLOGY

## 2025-06-24 RX ORDER — BUDESONIDE 3 MG/1
3 CAPSULES DAILY FOR 30 DAYS, THEN 2 DAILY FOR 2 WEEKS, THEN 1 DAILY FOR 1 WEEK CAPSULE, COATED PELLETS ORAL ONCE A DAY
Qty: 90 | Refills: 0 | Status: ON HOLD | COMMUNITY

## 2025-06-24 RX ORDER — DICYCLOMINE HYDROCHLORIDE 10 MG/1
TAKE 1 CAPSULE (10 MG) BY ORAL ROUTE 3 TIMES PER DAY AS NEEDED FOR ABDOMINAL PAIN CAPSULE ORAL
Qty: 20 | Refills: 0 | Status: ON HOLD | COMMUNITY
Start: 2019-03-13

## 2025-06-24 RX ORDER — ADALIMUMAB 40MG/0.4ML
INJECT 40 MG (0.4 ML) UNDER THE SKIN KIT SUBCUTANEOUS EVERY OTHER WEEK
Qty: 6 KIT | Refills: 3 | Status: ACTIVE | COMMUNITY
End: 2026-02-26

## 2025-07-30 ENCOUNTER — WEB ENCOUNTER (OUTPATIENT)
Dept: URBAN - METROPOLITAN AREA CLINIC 2 | Facility: CLINIC | Age: 40
End: 2025-07-30